# Patient Record
Sex: MALE | Race: WHITE | NOT HISPANIC OR LATINO | Employment: FULL TIME | URBAN - METROPOLITAN AREA
[De-identification: names, ages, dates, MRNs, and addresses within clinical notes are randomized per-mention and may not be internally consistent; named-entity substitution may affect disease eponyms.]

---

## 2021-04-01 DIAGNOSIS — Z23 ENCOUNTER FOR IMMUNIZATION: ICD-10-CM

## 2021-04-11 ENCOUNTER — IMMUNIZATIONS (OUTPATIENT)
Dept: FAMILY MEDICINE CLINIC | Facility: HOSPITAL | Age: 56
End: 2021-04-11

## 2021-04-11 DIAGNOSIS — Z23 ENCOUNTER FOR IMMUNIZATION: Primary | ICD-10-CM

## 2021-04-11 PROCEDURE — 91300 SARS-COV-2 / COVID-19 MRNA VACCINE (PFIZER-BIONTECH) 30 MCG: CPT

## 2021-04-11 PROCEDURE — 0001A SARS-COV-2 / COVID-19 MRNA VACCINE (PFIZER-BIONTECH) 30 MCG: CPT

## 2021-05-02 ENCOUNTER — IMMUNIZATIONS (OUTPATIENT)
Dept: FAMILY MEDICINE CLINIC | Facility: HOSPITAL | Age: 56
End: 2021-05-02

## 2021-05-02 DIAGNOSIS — Z23 ENCOUNTER FOR IMMUNIZATION: Primary | ICD-10-CM

## 2021-05-02 PROCEDURE — 91300 SARS-COV-2 / COVID-19 MRNA VACCINE (PFIZER-BIONTECH) 30 MCG: CPT

## 2021-05-02 PROCEDURE — 0002A SARS-COV-2 / COVID-19 MRNA VACCINE (PFIZER-BIONTECH) 30 MCG: CPT

## 2021-10-25 ENCOUNTER — OCCMED (OUTPATIENT)
Dept: URGENT CARE | Age: 56
End: 2021-10-25
Payer: OTHER MISCELLANEOUS

## 2021-10-25 ENCOUNTER — APPOINTMENT (OUTPATIENT)
Dept: RADIOLOGY | Age: 56
End: 2021-10-25
Attending: PHYSICIAN ASSISTANT
Payer: OTHER MISCELLANEOUS

## 2021-10-25 DIAGNOSIS — T14.90XA INJURY: ICD-10-CM

## 2021-10-25 DIAGNOSIS — T14.90XA INJURY: Primary | ICD-10-CM

## 2021-10-25 PROCEDURE — G0382 LEV 3 HOSP TYPE B ED VISIT: HCPCS | Performed by: PHYSICIAN ASSISTANT

## 2021-10-25 PROCEDURE — 73030 X-RAY EXAM OF SHOULDER: CPT

## 2021-10-25 PROCEDURE — 99283 EMERGENCY DEPT VISIT LOW MDM: CPT | Performed by: PHYSICIAN ASSISTANT

## 2021-11-13 ENCOUNTER — IMMUNIZATIONS (OUTPATIENT)
Dept: FAMILY MEDICINE CLINIC | Facility: HOSPITAL | Age: 56
End: 2021-11-13

## 2021-11-13 DIAGNOSIS — Z23 ENCOUNTER FOR IMMUNIZATION: Primary | ICD-10-CM

## 2021-11-13 PROCEDURE — 0001A COVID-19 PFIZER VACC 0.3 ML: CPT

## 2021-11-13 PROCEDURE — 91300 COVID-19 PFIZER VACC 0.3 ML: CPT

## 2021-11-16 ENCOUNTER — APPOINTMENT (OUTPATIENT)
Dept: URGENT CARE | Age: 56
End: 2021-11-16
Payer: OTHER MISCELLANEOUS

## 2021-11-16 PROCEDURE — 99213 OFFICE O/P EST LOW 20 MIN: CPT | Performed by: STUDENT IN AN ORGANIZED HEALTH CARE EDUCATION/TRAINING PROGRAM

## 2021-11-30 ENCOUNTER — APPOINTMENT (OUTPATIENT)
Dept: URGENT CARE | Age: 56
End: 2021-11-30
Payer: OTHER MISCELLANEOUS

## 2021-11-30 PROCEDURE — 99213 OFFICE O/P EST LOW 20 MIN: CPT | Performed by: STUDENT IN AN ORGANIZED HEALTH CARE EDUCATION/TRAINING PROGRAM

## 2022-07-25 ENCOUNTER — HOSPITAL ENCOUNTER (EMERGENCY)
Facility: HOSPITAL | Age: 57
Discharge: HOME | End: 2022-07-25
Attending: EMERGENCY MEDICINE
Payer: OTHER MISCELLANEOUS

## 2022-07-25 VITALS
DIASTOLIC BLOOD PRESSURE: 84 MMHG | TEMPERATURE: 97.6 F | WEIGHT: 155.2 LBS | HEART RATE: 80 BPM | OXYGEN SATURATION: 98 % | SYSTOLIC BLOOD PRESSURE: 132 MMHG | RESPIRATION RATE: 18 BRPM

## 2022-07-25 DIAGNOSIS — E86.9 VOLUME DEPLETION: ICD-10-CM

## 2022-07-25 DIAGNOSIS — R55 SYNCOPE: Primary | ICD-10-CM

## 2022-07-25 LAB
ALBUMIN SERPL BCP-MCNC: 3.9 G/DL (ref 3.5–5)
ALP SERPL-CCNC: 55 U/L (ref 34–104)
ALT SERPL W P-5'-P-CCNC: 47 U/L (ref 7–52)
ANION GAP SERPL CALCULATED.3IONS-SCNC: 7 MMOL/L (ref 4–13)
AST SERPL W P-5'-P-CCNC: 38 U/L (ref 13–39)
ATRIAL RATE: 71 BPM
BASOPHILS # BLD AUTO: 0.1 THOUSANDS/ΜL (ref 0–0.1)
BASOPHILS NFR BLD AUTO: 1 % (ref 0–1)
BILIRUB SERPL-MCNC: 0.4 MG/DL (ref 0.2–1)
BUN SERPL-MCNC: 11 MG/DL (ref 5–25)
CALCIUM SERPL-MCNC: 8.8 MG/DL (ref 8.4–10.2)
CARDIAC TROPONIN I PNL SERPL HS: 3 NG/L
CHLORIDE SERPL-SCNC: 109 MMOL/L (ref 96–108)
CO2 SERPL-SCNC: 22 MMOL/L (ref 21–32)
CREAT SERPL-MCNC: 1.16 MG/DL (ref 0.6–1.3)
EOSINOPHIL # BLD AUTO: 0.06 THOUSAND/ΜL (ref 0–0.61)
EOSINOPHIL NFR BLD AUTO: 0 % (ref 0–6)
ERYTHROCYTE [DISTWIDTH] IN BLOOD BY AUTOMATED COUNT: 12 % (ref 11.6–15.1)
GFR SERPL CREATININE-BSD FRML MDRD: 70 ML/MIN/1.73SQ M
GLUCOSE SERPL-MCNC: 90 MG/DL (ref 65–140)
HCT VFR BLD AUTO: 43.2 % (ref 36.5–49.3)
HGB BLD-MCNC: 14.8 G/DL (ref 12–17)
IMM GRANULOCYTES # BLD AUTO: 0.11 THOUSAND/UL (ref 0–0.2)
IMM GRANULOCYTES NFR BLD AUTO: 1 % (ref 0–2)
LYMPHOCYTES # BLD AUTO: 1.06 THOUSANDS/ΜL (ref 0.6–4.47)
LYMPHOCYTES NFR BLD AUTO: 7 % (ref 14–44)
MCH RBC QN AUTO: 33.5 PG (ref 26.8–34.3)
MCHC RBC AUTO-ENTMCNC: 34.3 G/DL (ref 31.4–37.4)
MCV RBC AUTO: 98 FL (ref 82–98)
MONOCYTES # BLD AUTO: 1.03 THOUSAND/ΜL (ref 0.17–1.22)
MONOCYTES NFR BLD AUTO: 7 % (ref 4–12)
NEUTROPHILS # BLD AUTO: 13.01 THOUSANDS/ΜL (ref 1.85–7.62)
NEUTS SEG NFR BLD AUTO: 84 % (ref 43–75)
NRBC BLD AUTO-RTO: 0 /100 WBCS
P AXIS: 57 DEGREES
PLATELET # BLD AUTO: 262 THOUSANDS/UL (ref 149–390)
PMV BLD AUTO: 9.3 FL (ref 8.9–12.7)
POTASSIUM SERPL-SCNC: 4.4 MMOL/L (ref 3.5–5.3)
PR INTERVAL: 152 MS
PROT SERPL-MCNC: 6.4 G/DL (ref 6.4–8.4)
QRS AXIS: 63 DEGREES
QRSD INTERVAL: 90 MS
QT INTERVAL: 406 MS
QTC INTERVAL: 441 MS
RBC # BLD AUTO: 4.42 MILLION/UL (ref 3.88–5.62)
SODIUM SERPL-SCNC: 138 MMOL/L (ref 135–147)
T WAVE AXIS: 52 DEGREES
VENTRICULAR RATE: 71 BPM
WBC # BLD AUTO: 15.37 THOUSAND/UL (ref 4.31–10.16)

## 2022-07-25 PROCEDURE — 96365 THER/PROPH/DIAG IV INF INIT: CPT

## 2022-07-25 PROCEDURE — 84484 ASSAY OF TROPONIN QUANT: CPT | Performed by: EMERGENCY MEDICINE

## 2022-07-25 PROCEDURE — 93010 ELECTROCARDIOGRAM REPORT: CPT | Performed by: INTERNAL MEDICINE

## 2022-07-25 PROCEDURE — 96366 THER/PROPH/DIAG IV INF ADDON: CPT

## 2022-07-25 PROCEDURE — 99285 EMERGENCY DEPT VISIT HI MDM: CPT

## 2022-07-25 PROCEDURE — 85025 COMPLETE CBC W/AUTO DIFF WBC: CPT | Performed by: EMERGENCY MEDICINE

## 2022-07-25 PROCEDURE — 93005 ELECTROCARDIOGRAM TRACING: CPT

## 2022-07-25 PROCEDURE — 80053 COMPREHEN METABOLIC PANEL: CPT | Performed by: EMERGENCY MEDICINE

## 2022-07-25 PROCEDURE — 99285 EMERGENCY DEPT VISIT HI MDM: CPT | Performed by: EMERGENCY MEDICINE

## 2022-07-25 PROCEDURE — 36415 COLL VENOUS BLD VENIPUNCTURE: CPT | Performed by: EMERGENCY MEDICINE

## 2022-07-25 RX ORDER — ONDANSETRON 2 MG/ML
1 INJECTION INTRAMUSCULAR; INTRAVENOUS ONCE
Status: COMPLETED | OUTPATIENT
Start: 2022-07-25 | End: 2022-07-25

## 2022-07-25 RX ORDER — ONDANSETRON 2 MG/ML
1 INJECTION INTRAMUSCULAR; INTRAVENOUS ONCE
Status: DISCONTINUED | OUTPATIENT
Start: 2022-07-25 | End: 2022-07-25

## 2022-07-25 RX ADMIN — SODIUM CHLORIDE, SODIUM LACTATE, POTASSIUM CHLORIDE, AND CALCIUM CHLORIDE 1000 ML: .6; .31; .03; .02 INJECTION, SOLUTION INTRAVENOUS at 09:30

## 2022-07-25 NOTE — Clinical Note
Reynold Saint was seen and treated in our emergency department on 7/25/2022  Diagnosis:     Jaun Robles  may return to work on return date  He may return on this date: 07/26/2022         If you have any questions or concerns, please don't hesitate to call        Courtney Gonzalez, DO    ______________________________           _______________          _______________  Hospital Representative                              Date                                Time

## 2022-07-25 NOTE — Clinical Note
Viola Cao was seen and treated in our emergency department on 7/25/2022  Diagnosis:     Judson Last  may return to work on return date  He may return on this date: 07/26/2022         If you have any questions or concerns, please don't hesitate to call        Nereida Collins DO    ______________________________           _______________          _______________  Hospital Representative                              Date                                Time

## 2022-07-25 NOTE — ED PROVIDER NOTES
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  He was at work this morning when he began to feel lightheaded, diaphoretic and nauseous  He also describes onset of tunnel vision  He was standing at the time but was able to sit in a chair  He put his head down and did lose consciousness  Bystanders noted that he had 1 episode of vomiting  His coworkers put ice on him inpatient began to recover  He return to baseline quickly  He felt much better upon regaining consciousness  However he was noted to have a low blood pressure by EMS  Therefore he was transported to the ED with IV fluids running  Patient states that he feels back to baseline  He denies any associated chest pain, palpitations, shortness of breath or other concerns  Patient did have fluids this morning and denies any previous episodes of syncope  History provided by:  Patient  Syncope  Episode history:  Single  Most recent episode: Today  Progression:  Resolved  Chronicity:  New  Witnessed: yes    Associated symptoms: diaphoresis, nausea and vomiting    Associated symptoms: no chest pain, no difficulty breathing, no dizziness, no fever, no headaches, no palpitations and no shortness of breath        None       Past Medical History:   Diagnosis Date    Hypertension        History reviewed  No pertinent surgical history  History reviewed  No pertinent family history  I have reviewed and agree with the history as documented      E-Cigarette/Vaping    E-Cigarette Use Never User      E-Cigarette/Vaping Substances     Social History     Tobacco Use    Smoking status: Current Every Day Smoker     Packs/day: 1 00     Years: 30 00     Pack years: 30 00     Types: Cigarettes    Smokeless tobacco: Never Used   Vaping Use    Vaping Use: Never used       Review of Systems   Constitutional: Positive for diaphoresis  Negative for chills and fever  HENT: Negative for nosebleeds, sore throat and trouble swallowing  Eyes: Negative for photophobia, pain and visual disturbance  Respiratory: Negative for cough, chest tightness and shortness of breath  Cardiovascular: Positive for syncope  Negative for chest pain, palpitations and leg swelling  Gastrointestinal: Positive for nausea and vomiting  Negative for abdominal pain, constipation and diarrhea  Endocrine: Negative for polydipsia and polyuria  Genitourinary: Negative for difficulty urinating, dysuria and hematuria  Musculoskeletal: Negative for back pain, neck pain and neck stiffness  Skin: Negative for pallor and rash  Neurological: Negative for dizziness, syncope, light-headedness and headaches  All other systems reviewed and are negative  Physical Exam  Physical Exam  Vitals and nursing note reviewed  Constitutional:       General: He is not in acute distress  Appearance: He is well-developed  HENT:      Head: Normocephalic and atraumatic  Eyes:      Pupils: Pupils are equal, round, and reactive to light  Cardiovascular:      Rate and Rhythm: Normal rate and regular rhythm  Pulses: Normal pulses  Heart sounds: Normal heart sounds  Pulmonary:      Effort: Pulmonary effort is normal  No respiratory distress  Breath sounds: Normal breath sounds  Abdominal:      General: There is no distension  Palpations: Abdomen is soft  Abdomen is not rigid  Tenderness: There is no abdominal tenderness  There is no guarding or rebound     Musculoskeletal:         General: No tenderness  Normal range of motion  Cervical back: Normal range of motion and neck supple  Lymphadenopathy:      Cervical: No cervical adenopathy  Skin:     General: Skin is warm and dry  Capillary Refill: Capillary refill takes less than 2 seconds  Neurological:      Mental Status: He is alert and oriented to person, place, and time  Cranial Nerves: No cranial nerve deficit  Sensory: No sensory deficit           Vital Signs  ED Triage Vitals [07/25/22 0810]   Temperature Pulse Respirations Blood Pressure SpO2   97 6 °F (36 4 °C) 73 18 95/58 98 %      Temp Source Heart Rate Source Patient Position - Orthostatic VS BP Location FiO2 (%)   Oral Monitor Sitting Right arm --      Pain Score       No Pain           Vitals:    07/25/22 0810 07/25/22 0930 07/25/22 1115   BP: 95/58 106/59 132/84   Pulse: 73 76 80   Patient Position - Orthostatic VS: Sitting Lying Lying         Visual Acuity      ED Medications  Medications   ondansetron (FOR EMS ONLY) (ZOFRAN) 4 mg/2 mL injection 4 mg (0 mg Does not apply Given to EMS 7/25/22 0813)   lactated ringers bolus 1,000 mL (0 mL Intravenous Stopped 7/25/22 1131)       Diagnostic Studies  Results Reviewed     Procedure Component Value Units Date/Time    HS Troponin 0hr (reflex protocol) [[id number]]  (Normal) Collected: 07/25/22 0926    Lab Status: Final result Specimen: Blood from Arm, Left Updated: 07/25/22 1012     hs TnI 0hr 3 ng/L     Comprehensive metabolic panel [[id number]]  (Abnormal) Collected: 07/25/22 0926    Lab Status: Final result Specimen: Blood from Arm, Left Updated: 07/25/22 1005     Sodium 138 mmol/L      Potassium 4 4 mmol/L      Chloride 109 mmol/L      CO2 22 mmol/L      ANION GAP 7 mmol/L      BUN 11 mg/dL      Creatinine 1 16 mg/dL      Glucose 90 mg/dL      Calcium 8 8 mg/dL      AST 38 U/L      ALT 47 U/L      Alkaline Phosphatase 55 U/L      Total Protein 6 4 g/dL      Albumin 3 9 g/dL      Total Bilirubin 0 40 mg/dL      eGFR 70 ml/min/1 73sq m     Narrative:      National Kidney Disease Foundation guidelines for Chronic Kidney Disease (CKD):     Stage 1 with normal or high GFR (GFR > 90 mL/min/1 73 square meters)    Stage 2 Mild CKD (GFR = 60-89 mL/min/1 73 square meters)    Stage 3A Moderate CKD (GFR = 45-59 mL/min/1 73 square meters)    Stage 3B Moderate CKD (GFR = 30-44 mL/min/1 73 square meters)    Stage 4 Severe CKD (GFR = 15-29 mL/min/1 73 square meters)    Stage 5 End Stage CKD (GFR <15 mL/min/1 73 square meters)  Note: GFR calculation is accurate only with a steady state creatinine    CBC and differential [[id number]]  (Abnormal) Collected: 07/25/22 0926    Lab Status: Final result Specimen: Blood from Arm, Left Updated: 07/25/22 0944     WBC 15 37 Thousand/uL      RBC 4 42 Million/uL      Hemoglobin 14 8 g/dL      Hematocrit 43 2 %      MCV 98 fL      MCH 33 5 pg      MCHC 34 3 g/dL      RDW 12 0 %      MPV 9 3 fL      Platelets 474 Thousands/uL      nRBC 0 /100 WBCs      Neutrophils Relative 84 %      Immat GRANS % 1 %      Lymphocytes Relative 7 %      Monocytes Relative 7 %      Eosinophils Relative 0 %      Basophils Relative 1 %      Neutrophils Absolute 13 01 Thousands/µL      Immature Grans Absolute 0 11 Thousand/uL      Lymphocytes Absolute 1 06 Thousands/µL      Monocytes Absolute 1 03 Thousand/µL      Eosinophils Absolute 0 06 Thousand/µL      Basophils Absolute 0 10 Thousands/µL                  No orders to display              Procedures  ECG 12 Lead Documentation Only    Date/Time: 7/25/2022 9:13 AM  Performed by: Roxi Hyde DO  Authorized by: Roxi Hyde DO     ECG reviewed by me, the ED Provider: yes    Patient location:  ED  Previous ECG:     Previous ECG:  Unavailable    Comparison to cardiac monitor: Yes    Comments:      Normal sinus rhythm at a rate of 71 beats per minute  Normal intervals  Normal axis  Normal QRS  No ST T wave abnormalities  No old for comparison               ED Course                                             MDM  Number of Diagnoses or Management Options  Syncope: new and requires workup  Volume depletion: new and requires workup  Diagnosis management comments: Patient presents after an episode of syncope  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  He began to feel nauseous, lightheaded and had vision changes  His symptoms are consistent with vasovagal syncope  Doubt cardiogenic syncope  EKG reveals no evidence of acute ischemia, bradycardia, AV blocks, WPW, prolonged QTC, Brugada syndrome, hypertrophic cardiomyopathy, arrhythmogenic right ventricular dysplasia  Patient was given IV fluids with improvement in his blood pressure  He ambulated in the emergency department with no symptoms  He is stable for discharge in outpatient follow-up  Encouraged p o  hydration  Encourage follow-up with PCP and return to ED if symptoms recur  Portions of the above record have been created with voice recognition software   Occasional wrong word or "sound alike" substitutions may have occurred due to the inherent limitations of voice recognition software   Read the chart carefully and recognize, using context, where substitutions may have occurred           Amount and/or Complexity of Data Reviewed  Clinical lab tests: ordered and reviewed  Tests in the medicine section of CPT®: ordered and reviewed  Review and summarize past medical records: yes  Independent visualization of images, tracings, or specimens: yes    Risk of Complications, Morbidity, and/or Mortality  Presenting problems: moderate  Diagnostic procedures: moderate  Management options: moderate    Patient Progress  Patient progress: resolved      Disposition  Final diagnoses:   Syncope   Volume depletion     Time reflects when diagnosis was documented in both MDM as applicable and the Disposition within this note     Time User Action Codes Description Comment    7/25/2022 11:25 AM Kostas Yu Add [R55] Syncope     7/25/2022 11:25 AM Kostas Yu Add [E86 9] Volume depletion       ED Disposition     ED Disposition   Discharge    Condition   Stable    Date/Time   Mon Jul 25, 2022 11:25 AM    Comment   Juan Manuel Virgen discharge to home/self care  Follow-up Information     Follow up With Specialties Details Why Contact Info    Your Primary Care Physician  Schedule an appointment as soon as possible for a visit  Return to ED sooner if symptoms recur or you develop chest pain, shortness of breath or other concerns  There are no discharge medications for this patient  No discharge procedures on file      PDMP Review     None          ED Provider  Electronically Signed by           El Olivia DO  07/27/22 5833

## 2024-04-11 ENCOUNTER — APPOINTMENT (EMERGENCY)
Dept: CT IMAGING | Facility: HOSPITAL | Age: 59
End: 2024-04-11
Payer: COMMERCIAL

## 2024-04-11 ENCOUNTER — HOSPITAL ENCOUNTER (EMERGENCY)
Facility: HOSPITAL | Age: 59
Discharge: HOME | End: 2024-04-11
Attending: EMERGENCY MEDICINE
Payer: COMMERCIAL

## 2024-04-11 VITALS
OXYGEN SATURATION: 98 % | TEMPERATURE: 98.9 F | DIASTOLIC BLOOD PRESSURE: 58 MMHG | WEIGHT: 155.2 LBS | RESPIRATION RATE: 18 BRPM | HEIGHT: 67 IN | HEART RATE: 69 BPM | SYSTOLIC BLOOD PRESSURE: 125 MMHG | BODY MASS INDEX: 24.36 KG/M2

## 2024-04-11 DIAGNOSIS — R10.11 RIGHT UPPER QUADRANT ABDOMINAL PAIN: Primary | ICD-10-CM

## 2024-04-11 LAB
ALBUMIN SERPL BCP-MCNC: 4.4 G/DL (ref 3.5–5)
ALP SERPL-CCNC: 54 U/L (ref 34–104)
ALT SERPL W P-5'-P-CCNC: 37 U/L (ref 7–52)
ANION GAP SERPL CALCULATED.3IONS-SCNC: 9 MMOL/L (ref 4–13)
AST SERPL W P-5'-P-CCNC: 34 U/L (ref 13–39)
BASOPHILS # BLD AUTO: 0.08 THOUSANDS/ÂΜL (ref 0–0.1)
BASOPHILS NFR BLD AUTO: 1 % (ref 0–1)
BILIRUB SERPL-MCNC: 0.48 MG/DL (ref 0.2–1)
BUN SERPL-MCNC: 15 MG/DL (ref 5–25)
CALCIUM SERPL-MCNC: 9.2 MG/DL (ref 8.4–10.2)
CHLORIDE SERPL-SCNC: 105 MMOL/L (ref 96–108)
CO2 SERPL-SCNC: 22 MMOL/L (ref 21–32)
CREAT SERPL-MCNC: 1.01 MG/DL (ref 0.6–1.3)
EOSINOPHIL # BLD AUTO: 0.12 THOUSAND/ÂΜL (ref 0–0.61)
EOSINOPHIL NFR BLD AUTO: 1 % (ref 0–6)
ERYTHROCYTE [DISTWIDTH] IN BLOOD BY AUTOMATED COUNT: 12 % (ref 11.6–15.1)
GFR SERPL CREATININE-BSD FRML MDRD: 81 ML/MIN/1.73SQ M
GLUCOSE SERPL-MCNC: 87 MG/DL (ref 65–140)
HCT VFR BLD AUTO: 41.3 % (ref 36.5–49.3)
HGB BLD-MCNC: 13.6 G/DL (ref 12–17)
IMM GRANULOCYTES # BLD AUTO: 0.04 THOUSAND/UL (ref 0–0.2)
IMM GRANULOCYTES NFR BLD AUTO: 0 % (ref 0–2)
LIPASE SERPL-CCNC: 31 U/L (ref 11–82)
LYMPHOCYTES # BLD AUTO: 1.25 THOUSANDS/ÂΜL (ref 0.6–4.47)
LYMPHOCYTES NFR BLD AUTO: 12 % (ref 14–44)
MCH RBC QN AUTO: 32.2 PG (ref 26.8–34.3)
MCHC RBC AUTO-ENTMCNC: 32.9 G/DL (ref 31.4–37.4)
MCV RBC AUTO: 98 FL (ref 82–98)
MONOCYTES # BLD AUTO: 0.64 THOUSAND/ÂΜL (ref 0.17–1.22)
MONOCYTES NFR BLD AUTO: 6 % (ref 4–12)
NEUTROPHILS # BLD AUTO: 7.95 THOUSANDS/ÂΜL (ref 1.85–7.62)
NEUTS SEG NFR BLD AUTO: 80 % (ref 43–75)
NRBC BLD AUTO-RTO: 0 /100 WBCS
PLATELET # BLD AUTO: 335 THOUSANDS/UL (ref 149–390)
PMV BLD AUTO: 10 FL (ref 8.9–12.7)
POTASSIUM SERPL-SCNC: 4.5 MMOL/L (ref 3.5–5.3)
PROT SERPL-MCNC: 7.3 G/DL (ref 6.4–8.4)
RBC # BLD AUTO: 4.23 MILLION/UL (ref 3.88–5.62)
SODIUM SERPL-SCNC: 136 MMOL/L (ref 135–147)
WBC # BLD AUTO: 10.08 THOUSAND/UL (ref 4.31–10.16)

## 2024-04-11 PROCEDURE — 80053 COMPREHEN METABOLIC PANEL: CPT | Performed by: EMERGENCY MEDICINE

## 2024-04-11 PROCEDURE — 83690 ASSAY OF LIPASE: CPT | Performed by: EMERGENCY MEDICINE

## 2024-04-11 PROCEDURE — 99284 EMERGENCY DEPT VISIT MOD MDM: CPT

## 2024-04-11 PROCEDURE — 85025 COMPLETE CBC W/AUTO DIFF WBC: CPT | Performed by: EMERGENCY MEDICINE

## 2024-04-11 PROCEDURE — 36415 COLL VENOUS BLD VENIPUNCTURE: CPT

## 2024-04-11 PROCEDURE — 74174 CTA ABD&PLVS W/CONTRAST: CPT

## 2024-04-11 RX ADMIN — IOHEXOL 100 ML: 350 INJECTION, SOLUTION INTRAVENOUS at 13:14

## 2024-04-11 NOTE — ED PROCEDURE NOTE
Procedure  POC Renal US    Date/Time: 4/11/2024 2:19 PM    Performed by: Janes Winn DO  Authorized by: Janes Winn DO    Patient location:  ED  Performed by:  Resident  Procedure performed by consultant: Armando Mendoza DO; [place] Jung DO.    Procedure details:     Exam Type:  Educational    Indications: flank/back pain      Views obtained: right kidney      Image quality: limited diagnostic      Image availability:  Video obtained  Findings:     RIGHT kidney findings: unremarkable      RIGHT hydronephrosis: none    Interpretation:     Renal ultrasound impressions: normal exam    Comments:      Limited to the R renal.                   Janes Winn DO  04/11/24 9849

## 2024-04-11 NOTE — ED PROCEDURE NOTE
Procedure  POC Biliary US    Date/Time: 4/11/2024 2:21 PM    Performed by: Janes Winn DO  Authorized by: Janes Winn DO    Patient location:  ED  Performed by:  Resident  Procedure performed by consultant: Armando Mendoza DO; Florida Jung DO.    Procedure details:     Exam Type:  Educational    Indications: upper right quadrant abdominal pain      Assessment for:  Mass    Views obtained: gallbladder (transverse and longitudinal) and liver      Image quality: limited diagnostic      Image availability:  Video obtained  Findings:     Cholelithiasis: not identified      Common bile duct:  Unable to visualize    Gallbladder wall:  Normal    Pericholecystic fluid: not identified      Mass: not identified    Interpretation:     Biliary ultrasound impressions: normal gallbladder ultrasound                     Janes Winn DO  04/11/24 142

## 2024-04-11 NOTE — DISCHARGE INSTRUCTIONS
Follow up with general surgery in 1 week   Discuss imaging results of possible herniation with them  Eat a diet high in fibre & avoid dehydration  Visit w/ your PCP soon

## 2024-04-11 NOTE — ED PROCEDURE NOTE
Procedure  POC MSK/Soft Tissue US    Date/Time: 4/11/2024 2:17 PM    Performed by: Janes Winn DO  Authorized by: Janes Winn DO    Patient location:  ED  Performed by:  Resident  Other Assisting Provider: Yes (comment) (Armando Mendoza DO; Florida Jung DO)    Procedure:     Performed: soft tissue ultrasound    Procedure details:     Exam Type:  Educational    Longitudinal view:  Obtained    Transverse view:  Obtained    Image quality: limited diagnostic      Image availability:  Video obtained  Soft tissue ultrasound:     Anatomic location:  Abdominal wall  Interpretation:     Soft tissue impressions: no soft tissue abnormality noted                     Janes Winn DO  04/11/24 1423

## 2024-04-11 NOTE — ED PROVIDER NOTES
History  Chief Complaint   Patient presents with    Abdominal Pain     Pt reports right sided abd swelling/lump x2 weeks, denies pain, states today it feels warm, was in [facility] 2 weeks ago, denies n/v/d     Pt presents w/ abdominal bulge to the RUQ poorly defined 10-20 cm across w/ associated mild pain described as dull & warmth for past couple days. Noticed it randomly by looking into mirror approx 2.5 wks ago, since then no noted change in appearance, PCP + family & friends rec he should get checked out at ER. Reports drinking approx 4 beers per day. Has been on recent exotic trips including most recently to [place], this recent trip occurred after onset of sx. Outside of increase to belching & flatus subjectively noted, pt complains of no other sx including fevers, fatigue, body aches, NVDC, bloating, GERD, SOB, & changes in urination or BM appearance.       History provided by:  Patient   used: No    Abdominal Pain  Pain location:  RUQ  Pain quality: dull    Pain radiates to:  Does not radiate  Pain severity:  Mild  Onset quality:  Gradual  Duration:  2 weeks  Timing:  Sporadic  Progression:  Waxing and waning  Chronicity:  New  Context: alcohol use, previous surgery and recent travel    Context: not diet changes, not recent illness, not retching, not sick contacts, not suspicious food intake and not trauma    Relieved by:  Flatus and belching  Worsened by:  Movement and deep breathing  Ineffective treatments:  Cold, heat, position changes and urination  Associated symptoms: belching and flatus    Associated symptoms: no anorexia, no chest pain, no constipation, no diarrhea, no dysuria, no fatigue, no fever, no hematemesis, no hematochezia, no hematuria, no melena, no nausea, no sore throat and no vomiting    Risk factors: alcohol abuse and multiple surgeries    Risk factors: no aspirin use, not elderly, no NSAID use, not obese, not pregnant and no recent hospitalization        None        Past Medical History:   Diagnosis Date    Hypertension        History reviewed. No pertinent surgical history.    History reviewed. No pertinent family history.  I have reviewed and agree with the history as documented.    E-Cigarette/Vaping    E-Cigarette Use Never User      E-Cigarette/Vaping Substances     Social History     Tobacco Use    Smoking status: Every Day     Current packs/day: 1.00     Average packs/day: 1 pack/day for 30.0 years (30.0 ttl pk-yrs)     Types: Cigarettes    Smokeless tobacco: Never   Vaping Use    Vaping status: Never Used        Review of Systems   Constitutional:  Negative for fatigue and fever.   HENT:  Negative for sore throat.    Cardiovascular:  Negative for chest pain.   Gastrointestinal:  Positive for abdominal pain and flatus. Negative for anorexia, constipation, diarrhea, hematemesis, hematochezia, melena, nausea and vomiting.   Genitourinary:  Negative for dysuria and hematuria.       Physical Exam  ED Triage Vitals [04/11/24 1109]   Temperature Pulse Respirations Blood Pressure SpO2   98.9 °F (37.2 °C) 71 18 131/68 98 %      Temp Source Heart Rate Source Patient Position - Orthostatic VS BP Location FiO2 (%)   Oral Monitor Sitting Right arm --      Pain Score       --             Orthostatic Vital Signs  Vitals:    04/11/24 1109 04/11/24 1416   BP: 131/68 125/58   Pulse: 71 69   Patient Position - Orthostatic VS: Sitting Lying       Physical Exam    ED Medications  Medications   iohexol (OMNIPAQUE) 350 MG/ML injection (MULTI-DOSE) 100 mL (100 mL Intravenous Given 4/11/24 1314)       Diagnostic Studies  Results Reviewed       Procedure Component Value Units Date/Time    Comprehensive metabolic panel [[id number]] Collected: 04/11/24 1114    Lab Status: Final result Specimen: Blood from Arm, Right Updated: 04/11/24 1211     Sodium 136 mmol/L      Potassium 4.5 mmol/L      Chloride 105 mmol/L      CO2 22 mmol/L      ANION GAP 9 mmol/L      BUN 15 mg/dL      Creatinine 1.01  mg/dL      Glucose 87 mg/dL      Calcium 9.2 mg/dL      AST 34 U/L      ALT 37 U/L      Alkaline Phosphatase 54 U/L      Total Protein 7.3 g/dL      Albumin 4.4 g/dL      Total Bilirubin 0.48 mg/dL      eGFR 81 ml/min/1.73sq m     Narrative:      National Kidney Disease Foundation guidelines for Chronic Kidney Disease (CKD):     Stage 1 with normal or high GFR (GFR > 90 mL/min/1.73 square meters)    Stage 2 Mild CKD (GFR = 60-89 mL/min/1.73 square meters)    Stage 3A Moderate CKD (GFR = 45-59 mL/min/1.73 square meters)    Stage 3B Moderate CKD (GFR = 30-44 mL/min/1.73 square meters)    Stage 4 Severe CKD (GFR = 15-29 mL/min/1.73 square meters)    Stage 5 End Stage CKD (GFR <15 mL/min/1.73 square meters)  Note: GFR calculation is accurate only with a steady state creatinine    Lipase [[id number]]  (Normal) Collected: 04/11/24 1114    Lab Status: Final result Specimen: Blood from Arm, Right Updated: 04/11/24 1211     Lipase 31 u/L     CBC and differential [[id number]]  (Abnormal) Collected: 04/11/24 1114    Lab Status: Final result Specimen: Blood from Arm, Right Updated: 04/11/24 1153     WBC 10.08 Thousand/uL      RBC 4.23 Million/uL      Hemoglobin 13.6 g/dL      Hematocrit 41.3 %      MCV 98 fL      MCH 32.2 pg      MCHC 32.9 g/dL      RDW 12.0 %      MPV 10.0 fL      Platelets 335 Thousands/uL      nRBC 0 /100 WBCs      Segmented % 80 %      Immature Grans % 0 %      Lymphocytes % 12 %      Monocytes % 6 %      Eosinophils Relative 1 %      Basophils Relative 1 %      Absolute Neutrophils 7.95 Thousands/µL      Absolute Immature Grans 0.04 Thousand/uL      Absolute Lymphocytes 1.25 Thousands/µL      Absolute Monocytes 0.64 Thousand/µL      Eosinophils Absolute 0.12 Thousand/µL      Basophils Absolute 0.08 Thousands/µL                    CTA abdomen pelvis w wo contrast   Final Result by Tasneem Dasilva MD (04/11 9979)   No acute intra-abdominal pathology.   No aortic dissection or aneurysm.   No abdominal mass.             Workstation performed: [id number]               Procedures  Procedures      ED Course           Medical Decision Making  Pt presented to ER w/ bulge to abd RUQ of 2+ weeks duration reportedly unchanged in size since onset. Differential includes hernia, mass, cyst, & fluid or air accumulation. Pt to receive CTAP w/ IV contrast to determine makeup / etiology of cc. CT read largely negative outside of mild hepatomegaly & some bowel superficial to the liver. Pt to be dc from ER as he is stable & no longer requiring hospital level of care, but should f/u w/ PCP w/in 1 week to address various chronic conditions such as alcohol drinking at roughly 30 per week & for continuity of care.    Amount and/or Complexity of Data Reviewed  Labs: ordered.  Radiology: ordered.    Risk  Prescription drug management.          Disposition  Final diagnoses:   Right upper quadrant abdominal pain     Time reflects when diagnosis was documented in both MDM as applicable and the Disposition within this note       Time User Action Codes Description Comment    4/11/2024  2:32 PM Du Loza Add [R10.11] Right upper quadrant abdominal pain           ED Disposition       ED Disposition   Discharge    Condition   Stable    Date/Time   Thu Apr 11, 2024  2:32 PM    Comment   Alexis Hutton discharge to home/self care.                   Follow-up Information       Follow up With Specialties Details Why Contact Info Additional Information    Boni Quiroga  Schedule an appointment as soon as possible for a visit in 1 week  [address] [place] [id number]  [phone]       [facility] Schedule an appointment as soon as possible for a visit in 1 week  [address]  [facility] [address] [phone] [facility] [address], [place], [place], [address]            There are no discharge medications for this patient.    No  discharge procedures on file.    PDMP Review       None             ED Provider  Attending physically available and evaluated Alexis Hutton. I managed the patient along with the ED Attending.    Electronically Signed by           Du Loza DO  04/11/24 2293

## 2024-04-24 ENCOUNTER — OFFICE VISIT (OUTPATIENT)
Dept: SURGERY | Facility: CLINIC | Age: 59
End: 2024-04-24
Payer: COMMERCIAL

## 2024-04-24 VITALS — BODY MASS INDEX: 24.33 KG/M2 | HEIGHT: 67 IN | WEIGHT: 155 LBS

## 2024-04-24 DIAGNOSIS — M62.50 MUSCULAR ATROPHY, UNSPECIFIED SITE: Primary | ICD-10-CM

## 2024-04-24 PROCEDURE — 99243 OFF/OP CNSLTJ NEW/EST LOW 30: CPT | Performed by: SURGERY

## 2024-04-24 NOTE — LETTER
"April 24, 2024     Boni Quiroga  [address] [facility]  [address] [place], [place] [id number]    Patient: Alexis Hutton   YOB: 1965   Date of Visit: 4/24/2024       Dear Dr. Quiroga:    Thank you for referring Alexsi Hutton to me for evaluation. Below are my notes for this consultation.    If you have questions, please do not hesitate to call me. I look forward to following your patient along with you.         Sincerely,        Ney Chaudhary, DO        CC: Du Loza, DO Channing Walton, DO Ney Chaudhary DO  4/24/2024  4:50 PM  Sign when Signing Visit  Assessment/Plan:    Diagnoses and all orders for this visit:    Muscular atrophy, unspecified site    Appears to have loss of some muscular tone of the right sided abdominal oblique muscles.  No signs of a hernia on exam nor CT scan.  Unclear as to the etiology of this.  No surgical intervention warranted.  Occasionally complains of burning.  Perhaps a neurology evaluation may be in order    May need a GI evaluation given what appears to be clinical complaints of irritable bowel syndrome.    Subjective:      Patient ID: Alexis Hutton is a 58 y.o. male.    Patient presents for evaluation of a bulge RUQ.  Patient states he noticed this about a month ago.  Did have a left inguinal hernia repair many years ago.  Unclear whether this was laparoscopic or open.  (On exam there is no left inguinal scar so I suspect it was laparoscopically.)  Occasionally has use of immediately running to the bathroom for a bowel movement after eating.    Occasional pain and burning in this region.  Went to the emergency room for evaluation.  He states he was told his \"intestines were wrapped around his liver\"    I did review his CT scan.  Did appear to be some slight atrophy of the oblique musculature on the right side.  No obvious hernia was noted.        The following portions of the patient's history were reviewed and updated as appropriate:     He  has a past medical history " "of Hypertension.  He  has no past surgical history on file.  His family history is not on file.  He  reports that he has been smoking cigarettes. He has a 30 pack-year smoking history. He has never used smokeless tobacco. No history on file for alcohol use and drug use.  No current outpatient medications on file.     No current facility-administered medications for this visit.     He has No Known Allergies..    Review of Systems   All other systems reviewed and are negative.        Objective:      Ht 5' 7\" (1.702 m)   Wt 70.3 kg (155 lb)   BMI 24.28 kg/m²          Physical Exam  Constitutional:       General: He is not in acute distress.  Abdominal:      General: Abdomen is flat.      Palpations: Abdomen is soft.      Hernia: A hernia (Small umbilical hernia.  Less than a centimeter in size.) is present.      Comments: Sitting position there is certainly some disparity protrusion of the right sided oblique muscles compared to the left.  No specific skin changes noted.  No abdominal wall hernias noted in this region.   Neurological:      Mental Status: He is alert.         "

## 2024-04-24 NOTE — PROGRESS NOTES
"Assessment/Plan:    Diagnoses and all orders for this visit:    Muscular atrophy, unspecified site    Appears to have loss of some muscular tone of the right sided abdominal oblique muscles.  No signs of a hernia on exam nor CT scan.  Unclear as to the etiology of this.  No surgical intervention warranted.  Occasionally complains of burning.  Perhaps a neurology evaluation may be in order    May need a GI evaluation given what appears to be clinical complaints of irritable bowel syndrome.    Subjective:      Patient ID: Alexis Hutton is a 58 y.o. male.    Patient presents for evaluation of a bulge RUQ.  Patient states he noticed this about a month ago.  Did have a left inguinal hernia repair many years ago.  Unclear whether this was laparoscopic or open.  (On exam there is no left inguinal scar so I suspect it was laparoscopically.)  Occasionally has use of immediately running to the bathroom for a bowel movement after eating.    Occasional pain and burning in this region.  Went to the emergency room for evaluation.  He states he was told his \"intestines were wrapped around his liver\"    I did review his CT scan.  Did appear to be some slight atrophy of the oblique musculature on the right side.  No obvious hernia was noted.        The following portions of the patient's history were reviewed and updated as appropriate:     He  has a past medical history of Hypertension.  He  has no past surgical history on file.  His family history is not on file.  He  reports that he has been smoking cigarettes. He has a 30 pack-year smoking history. He has never used smokeless tobacco. No history on file for alcohol use and drug use.  No current outpatient medications on file.     No current facility-administered medications for this visit.     He has No Known Allergies..    Review of Systems   All other systems reviewed and are negative.        Objective:      Ht 5' 7\" (1.702 m)   Wt 70.3 kg (155 lb)   BMI 24.28 kg/m²          " Physical Exam  Constitutional:       General: He is not in acute distress.  Abdominal:      General: Abdomen is flat.      Palpations: Abdomen is soft.      Hernia: A hernia (Small umbilical hernia.  Less than a centimeter in size.) is present.      Comments: Sitting position there is certainly some disparity protrusion of the right sided oblique muscles compared to the left.  No specific skin changes noted.  No abdominal wall hernias noted in this region.   Neurological:      Mental Status: He is alert.